# Patient Record
Sex: MALE | Race: WHITE | NOT HISPANIC OR LATINO | Employment: OTHER | URBAN - METROPOLITAN AREA
[De-identification: names, ages, dates, MRNs, and addresses within clinical notes are randomized per-mention and may not be internally consistent; named-entity substitution may affect disease eponyms.]

---

## 2021-08-20 ENCOUNTER — APPOINTMENT (OUTPATIENT)
Dept: CT IMAGING | Facility: CLINIC | Age: 86
End: 2021-08-20
Attending: PHYSICIAN ASSISTANT
Payer: MEDICARE

## 2021-08-20 ENCOUNTER — HOSPITAL ENCOUNTER (EMERGENCY)
Facility: CLINIC | Age: 86
Discharge: HOME | End: 2021-08-20
Attending: PHYSICIAN ASSISTANT | Admitting: PHYSICIAN ASSISTANT
Payer: MEDICARE

## 2021-08-20 ENCOUNTER — APPOINTMENT (OUTPATIENT)
Dept: GENERAL RADIOLOGY | Facility: CLINIC | Age: 86
End: 2021-08-20
Attending: PHYSICIAN ASSISTANT
Payer: MEDICARE

## 2021-08-20 VITALS
DIASTOLIC BLOOD PRESSURE: 44 MMHG | RESPIRATION RATE: 21 BRPM | OXYGEN SATURATION: 93 % | HEIGHT: 73 IN | BODY MASS INDEX: 25.31 KG/M2 | WEIGHT: 191 LBS | TEMPERATURE: 97.8 F | SYSTOLIC BLOOD PRESSURE: 131 MMHG | HEART RATE: 87 BPM

## 2021-08-20 DIAGNOSIS — S61.412A SKIN TEAR OF LEFT HAND WITHOUT COMPLICATION, INITIAL ENCOUNTER: ICD-10-CM

## 2021-08-20 DIAGNOSIS — S01.81XA FACIAL LACERATION, INITIAL ENCOUNTER: ICD-10-CM

## 2021-08-20 DIAGNOSIS — R55 SYNCOPE AND COLLAPSE: ICD-10-CM

## 2021-08-20 DIAGNOSIS — N18.32 STAGE 3B CHRONIC KIDNEY DISEASE (H): ICD-10-CM

## 2021-08-20 DIAGNOSIS — S00.33XA CONTUSION OF NOSE, INITIAL ENCOUNTER: ICD-10-CM

## 2021-08-20 LAB
ALBUMIN UR-MCNC: 20 MG/DL
ANION GAP SERPL CALCULATED.3IONS-SCNC: 5 MMOL/L (ref 3–14)
APPEARANCE UR: CLEAR
ATRIAL RATE - MUSE: 67 BPM
BASOPHILS # BLD AUTO: 0 10E3/UL (ref 0–0.2)
BASOPHILS NFR BLD AUTO: 0 %
BILIRUB UR QL STRIP: NEGATIVE
BUN SERPL-MCNC: 32 MG/DL (ref 7–30)
CALCIUM SERPL-MCNC: 9 MG/DL (ref 8.5–10.1)
CHLORIDE BLD-SCNC: 107 MMOL/L (ref 94–109)
CO2 SERPL-SCNC: 28 MMOL/L (ref 20–32)
COLOR UR AUTO: YELLOW
CREAT SERPL-MCNC: 1.44 MG/DL (ref 0.66–1.25)
DIASTOLIC BLOOD PRESSURE - MUSE: NORMAL MMHG
EOSINOPHIL # BLD AUTO: 0 10E3/UL (ref 0–0.7)
EOSINOPHIL NFR BLD AUTO: 1 %
ERYTHROCYTE [DISTWIDTH] IN BLOOD BY AUTOMATED COUNT: 14.1 % (ref 10–15)
GFR SERPL CREATININE-BSD FRML MDRD: 42 ML/MIN/1.73M2
GLUCOSE BLD-MCNC: 119 MG/DL (ref 70–99)
GLUCOSE UR STRIP-MCNC: NEGATIVE MG/DL
HCT VFR BLD AUTO: 40.5 % (ref 40–53)
HGB BLD-MCNC: 12.9 G/DL (ref 13.3–17.7)
HGB UR QL STRIP: NEGATIVE
IMM GRANULOCYTES # BLD: 0 10E3/UL
IMM GRANULOCYTES NFR BLD: 0 %
INTERPRETATION ECG - MUSE: NORMAL
KETONES UR STRIP-MCNC: NEGATIVE MG/DL
LEUKOCYTE ESTERASE UR QL STRIP: NEGATIVE
LYMPHOCYTES # BLD AUTO: 0.8 10E3/UL (ref 0.8–5.3)
LYMPHOCYTES NFR BLD AUTO: 24 %
MCH RBC QN AUTO: 28.8 PG (ref 26.5–33)
MCHC RBC AUTO-ENTMCNC: 31.9 G/DL (ref 31.5–36.5)
MCV RBC AUTO: 90 FL (ref 78–100)
MONOCYTES # BLD AUTO: 0.5 10E3/UL (ref 0–1.3)
MONOCYTES NFR BLD AUTO: 14 %
MUCOUS THREADS #/AREA URNS LPF: PRESENT /LPF
NEUTROPHILS # BLD AUTO: 1.9 10E3/UL (ref 1.6–8.3)
NEUTROPHILS NFR BLD AUTO: 61 %
NITRATE UR QL: NEGATIVE
NRBC # BLD AUTO: 0 10E3/UL
NRBC BLD AUTO-RTO: 0 /100
P AXIS - MUSE: 54 DEGREES
PH UR STRIP: 5 [PH] (ref 5–7)
PLATELET # BLD AUTO: 137 10E3/UL (ref 150–450)
POTASSIUM BLD-SCNC: 4.2 MMOL/L (ref 3.4–5.3)
PR INTERVAL - MUSE: 148 MS
QRS DURATION - MUSE: 86 MS
QT - MUSE: 416 MS
QTC - MUSE: 439 MS
R AXIS - MUSE: 44 DEGREES
RBC # BLD AUTO: 4.48 10E6/UL (ref 4.4–5.9)
RBC URINE: <1 /HPF
SODIUM SERPL-SCNC: 140 MMOL/L (ref 133–144)
SP GR UR STRIP: 1.02 (ref 1–1.03)
SQUAMOUS EPITHELIAL: <1 /HPF
SYSTOLIC BLOOD PRESSURE - MUSE: NORMAL MMHG
T AXIS - MUSE: 45 DEGREES
TROPONIN I SERPL-MCNC: <0.015 UG/L (ref 0–0.04)
UROBILINOGEN UR STRIP-MCNC: NORMAL MG/DL
VENTRICULAR RATE- MUSE: 67 BPM
WBC # BLD AUTO: 3.2 10E3/UL (ref 4–11)
WBC URINE: 1 /HPF

## 2021-08-20 PROCEDURE — 80048 BASIC METABOLIC PNL TOTAL CA: CPT | Performed by: PHYSICIAN ASSISTANT

## 2021-08-20 PROCEDURE — 93005 ELECTROCARDIOGRAM TRACING: CPT

## 2021-08-20 PROCEDURE — 84484 ASSAY OF TROPONIN QUANT: CPT | Performed by: PHYSICIAN ASSISTANT

## 2021-08-20 PROCEDURE — 12011 RPR F/E/E/N/L/M 2.5 CM/<: CPT

## 2021-08-20 PROCEDURE — 70450 CT HEAD/BRAIN W/O DYE: CPT

## 2021-08-20 PROCEDURE — 85025 COMPLETE CBC W/AUTO DIFF WBC: CPT | Performed by: PHYSICIAN ASSISTANT

## 2021-08-20 PROCEDURE — 73110 X-RAY EXAM OF WRIST: CPT | Mod: LT

## 2021-08-20 PROCEDURE — 81003 URINALYSIS AUTO W/O SCOPE: CPT | Performed by: PHYSICIAN ASSISTANT

## 2021-08-20 PROCEDURE — 36415 COLL VENOUS BLD VENIPUNCTURE: CPT | Performed by: PHYSICIAN ASSISTANT

## 2021-08-20 PROCEDURE — 99285 EMERGENCY DEPT VISIT HI MDM: CPT | Mod: 25

## 2021-08-20 RX ORDER — METHYLCELLULOSE 4000CPS 30 %
POWDER (GRAM) MISCELLANEOUS ONCE
Status: DISCONTINUED | OUTPATIENT
Start: 2021-08-20 | End: 2021-08-20 | Stop reason: HOSPADM

## 2021-08-20 ASSESSMENT — ENCOUNTER SYMPTOMS
LIGHT-HEADEDNESS: 0
WEAKNESS: 0
SHORTNESS OF BREATH: 0

## 2021-08-20 ASSESSMENT — MIFFLIN-ST. JEOR: SCORE: 1570.25

## 2021-08-20 NOTE — ED PROVIDER NOTES
"  History     Chief Complaint:  Fall      HPI   Zenon Nayak is a 89 year old male who presents with syncope. EMS states that the apartment below the patient's called EMS after hearing a thud. The patient reports he was going to get breakfast at 1000 when he had an episode of syncope. He reports not having an predisposing signs or symptoms before his fall including weakness and lightheadedness. The patient states that he hit his head in the fall and landed on his left wrist. The patient denies chest pain or shortness of breath. The patient denies being on any blood thinners.     Review of Systems   Respiratory: Negative for shortness of breath.    Cardiovascular: Negative for chest pain.   Neurological: Positive for syncope. Negative for weakness and light-headedness.   All other systems reviewed and are negative.    Allergies:  The patient has no known allergies.    Medications:    Zyloprim   Norvasc  Lipitor   Synthroid   Zestril   Aspirin     Past Medical History:    Malignant melanoma  Chronic gout   Arthritis   Hypertension  Hypothyroidism   Hyperlipidemia  Arteriosclerosis of coronary artery   CKD   SVT  DVT    Past Surgical History:    The patient denies past surgical history.    Family History:    Father- heart disease   Brother- Alzheimer's   Sister- Alzheimer's    Social History:  The patient presents alone.     Physical Exam     Patient Vitals for the past 24 hrs:   BP Temp Temp src Pulse Resp SpO2 Height Weight   08/20/21 1500 131/44 -- -- 87 21 93 % -- --   08/20/21 1445 -- -- -- 73 17 96 % -- --   08/20/21 1430 (!) 161/86 -- -- 60 14 96 % -- --   08/20/21 1415 (!) 161/84 -- -- 60 (!) 33 97 % -- --   08/20/21 1400 (!) 163/80 -- -- -- -- -- -- --   08/20/21 1331 -- -- -- -- -- 97 % -- --   08/20/21 1330 (!) 169/86 -- -- 66 16 98 % -- --   08/20/21 1324 (!) 171/76 97.8  F (36.6  C) Oral 65 18 -- 1.854 m (6' 1\") 86.6 kg (191 lb)       Physical Exam  General: Alert and interactive. Appears well. " Cooperative and pleasant.   Head: 2 cm laceration above right eye.   Eyes: The pupils are equal and round. EOMs intact. No scleral icterus.  ENT: No abnormalities to the external nose or ears. Mucous membranes moist. Posterior oropharynx is non-erythematous. Abrasion to bridge of nose. No tenderness to nasal bridge.    Neck: Trachea is in the midline. No nuchal rigidity. No midline neck TTP.     CV: Regular rate and rhythm. S1 and S2 normal without murmur, click, gallop or rub.   Resp: Breath sounds are clear bilaterally, without rhonchi, wheezes, rales. Non-labored, no retractions or accessory muscle use.     GI: Abdomen is soft without distension. No tenderness to palpation. No peritoneal signs.    MS: Moving all extremities well. Good muscle tone.   Skin: Multiple bruises to the hands. Large 4 cm skin tear to the dorsum of the left hand.    Psych: Awake. Alert.  Normal affect. Appropriate interactions.  Lymph: No anterior or posterior cervical lymphadenopathy noted.  Neuro:  Speech is normal and fluent.   Face is symmetric without droop. CNs II-XII intact. Negative pronator drift. Finger to nose intact. Heel to shin intact.   Right Arm: Good  strength. 5/5 elbow flexion. 5/5 elbow extension. Sensation intact to light touch.   Left Arm: Good  strength. 5/5 elbow flexion. 5/5 elbow extension. Sensation intact to light touch.   Right Le/5 straight leg raise, 5/5 knee flexion, 5/5 knee extension, 5/5 dorsiflexion, 5/5 plantar flexion. Sensation intact to light touch.   Left Le/5 straight leg raise, 5/5 knee flexion, 5/5 knee extension, 5/5 dorsiflexion, 5/5 plantar flexion. Sensation intact to light touch.            Gait: Slow but steady.     Emergency Department Course   ECG:  ECG taken at 1329, ECG read at 1333  Normal sinus rhythm  Nonspecific ST Abnormality   Rate 67 bpm. SD interval 148 ms. QRS duration 86 ms. QT/QTc 416/439 ms. P-R-T axes 54 44 45.     Imaging:  XR Wrist Left   Healed distal  radial ORIF with volar plate and screws in   place. Residual distal radial deformity is noted. No evidence of acute   fracture.   Reading per radiology.     CT Head w/o Contrast    1. No evidence of acute intracranial hemorrhage, mass, or herniation.    2. Diffuse parenchymal volume loss and white matter changes likely due    to chronic microvascular ischemic change.    3. Right frontal scalp soft tissue injury. No underlying calvarial    fracture.    Reading per radiology.    Laboratory:  CBC: WBC 3.2(L), HGB 12.9(L), (L)     BMP: urea nitrogen 32(H) creatinine 1.44(H) glucose 119(H) GFR 42(L) o/w WNL    Troponin (Collected 1334): <0.015    UA with microscopic: protein albumin 20(A) mucus present(A) o/w WNL     Procedures:    Laceration Repair        LACERATION:  A simple and superficial minimally Contaminated 2 cm laceration.      LOCATION:  Forehead      FUNCTION:  Distally sensation and circulation are intact.      ANESTHESIA:  LET - Topical      PREPARATION:  Irrigation with Normal Saline and Shur Clens      DEBRIDEMENT:  no debridement      CLOSURE:  Wound was closed with One Layer.  Skin closed with 6 x 6.0 Ethylon using interrupted sutures.        Emergency Department Course:    Reviewed:  I reviewed nursing notes, vitals and past history    Assessments:  1313 I obtained history and examined the patient as noted above.     1449 I rechecked the patient and explained findings.     1510 I preformed a laceration repair on patient's forehead and left wrist. See procedure notes above.     1545 I returned to check on patient.     Disposition:  The patient was discharged to home.    Impression & Plan      Medical Decision Making:  Zenon Nayak is a 89 year old male who presents after a fall, likely caused by syncope. Patient states that he got up to eat breakfast when he felt weak, causing him to have a syncopal episode. Staff on the floor below him heard a thud, and called EMS when he found him on a  ground. EMS was able to get him up and he is able to take steps.  He has a laceration to his right forehead and an abrasion to his nose but otherwise has no complaints at this time. His EKG shows no concerning arrhythmias or ischemic changes, and he was observed on the monitor here without any concerning arrhythmias that would cause syncope. His orthostatics were negative here, although I suspect that he likely had an orthostatic episode today causing his fall. CT the head shows no intracranial hemorrhage. He has no midline neck tenderness, distracting injury, neurologic deficits, or alcohol intoxication, and the cervical spine can be cleared via Nexus criteria. Patient has an abrasion to his nose but his nose is completely nontender, unlikely to represent an acute fracture. No other tenderness to the facial bones. His labs are reassuring.  But any significant anemia or leukocytosis. His creatinine is slightly elevated but at baseline without any significant acute kidney injury. UA negative  His troponin is negative, making ACS or cardiogenic syncope less likely. Likewise, no chest pain nor shortness of breath to suggest acute PE. X-rays obtained of the left wrist that is negative for fracture. His facial laceration was repaired with sutures, but his left hand laceration was a skin tear repaired with Steri-Strips and a pressure dressing given the friability of the skin.  Infection was discussed with the patient and his family at bedside.  Will follow with primary care in 5 days for suture removal. Return for fevers, infectious signs, recurrent syncope, chest pain/shorntess of breath.     Covid-19  Zenon Nayak was evaluated during a global COVID-19 pandemic, which necessitated consideration that the patient might be at risk for infection with the SARS-CoV-2 virus that causes COVID-19.   Applicable protocols for evaluation were followed during the patient's care.     Diagnosis:    ICD-10-CM    1. Syncope and  collapse  R55    2. Facial laceration, initial encounter  S01.81XA    3. Skin tear of left hand without complication, initial encounter  S61.412A    4. Stage 3b chronic kidney disease  N18.32    5. Contusion of nose, initial encounter  S00.33XA        Scribe Disclosure:  I, Roro Rodriguez, am serving as a scribe at 1:13 PM on 8/20/2021 to document services personally performed by Ashley De Paz PA-C based on my observations and the provider's statements to me.      Ashley De Paz PA-C  08/20/21 2045

## 2021-08-20 NOTE — ED NOTES
Bed: ED21  Expected date:   Expected time:   Means of arrival:   Comments:  439  89 M fall/head lac  1308

## 2021-08-28 ENCOUNTER — TELEPHONE (OUTPATIENT)
Dept: EMERGENCY MEDICINE | Facility: CLINIC | Age: 86
End: 2021-08-28

## 2021-08-28 NOTE — TELEPHONE ENCOUNTER
Daughter Vikki is calling to inquire if a COVID 19 test was done while in [facility] ER 8/20/21 (she is power of )  Upon review of chart no test is found from that visit for Covid 19  Yelitza Kim  [employer] [facility]  Emergency Dept Lab Result RN  Ph# [phone]